# Patient Record
(demographics unavailable — no encounter records)

---

## 2019-11-29 NOTE — RAD REPORT
EXAM DESCRIPTION:  Arianna Single View11/28/2019 11:00 pm

 

CLINICAL HISTORY:  Cough

 

COMPARISON:  2017

 

FINDINGS:   Mild bilateral pulmonary opacities. The heart is normal size

 

IMPRESSION:  Mild bilateral pulmonary opacities may indicate pneumonitis

## 2019-11-29 NOTE — RAD REPORT
EXAM DESCRIPTION:  US - Thyroid Para Parotid Gland - 11/29/2019 1:18 pm

 

CLINICAL HISTORY:  Thyroid nodule or mass right side of the neck chest junction

 

COMPARISON:  CT neck November 29

 

FINDINGS:  A 14 millimeter nodule is present in the mid right lobe. An 8 millimeter hypoechoic nodule
 is present in the mid right lobe with peripheral rim calcification. A dominant 19 millimeter nodule 
is present in the inferior right lobe. This is believed to be the correlate to the CT finding.

 

Isthmus is 4-5 mm. A 5 millimeter hypoechoic nodule is present in the lower left lobe with a vague 5 
mm solid nodule seen in the inferior left lobe as well.

 

Right lobe is 4.7 x 1.6 x 1.7 cm. Left lobe is 3.2 x 1.3 x 1.2 cm.

 

No adjacent mass or lymphadenopathy identified.

 

IMPRESSION:  Prominent right thyroid lobe with multiple enlarged nodules largest inferior margin 19 m
m. This is probably the correlate to the CT finding.

 

Small 5 mm or less left thyroid nodules.

## 2019-11-29 NOTE — EDPHYS
Physician Documentation                                                                           

 [facility]                                                                 

Name: Cherelle Chang                                                                                

Age: 62 yrs                                                                                       

Sex: Female                                                                                       

: 1957                                                                                   

MRN: [id number]                                                                                   

Arrival Date: 2019                                                                          

Time: 22:00                                                                                       

Account#: [id number]                                                                            

Bed 7                                                                                             

Private MD:                                                                                       

ED Physician Norris Castelan                                                                             

HPI:                                                                                              

                                                                                             

22:35 This 62 yrs old  Female presents to ER via Ambulatory with complaints of        pkl 

      Breathing Difficulty.                                                                       

22:35 The patient has shortness of breath at rest. Onset: The symptoms/episode began/occurred pkl 

      1 week(s) ago. Associated signs and symptoms: Pertinent positives: productive cough.        

      Patient was admitted to [facility] 3 days ago and was discharged yesterday.          

      Diagnosed with COPD exacerbation and diabetes. Patient said she is not any better. Does     

      not want to go back to [facility].                                                   

                                                                                                  

Historical:                                                                                       

- Allergies:                                                                                      

22:20 NKA;                                                                                    bb  

- Home Meds:                                                                                      

22:20 Albuterol Inhl [Active]; Atrovent Inhl [Active]; levofloxacin 750 mg Oral tab 1 tab     bb  

      once daily [Active]; metformin 500 mg Oral Tb24 1 tab once daily [Active]; prednisone       

      20 mg Oral tab once daily [Active];                                                         

- PMHx:                                                                                           

22:20 COPD; Diabetes - NIDDM;                                                                 bb  

- PSHx:                                                                                           

22:20 Hysterectomy; ;                                                                bb  

                                                                                                  

- Immunization history:: Adult Immunizations unknown.                                             

- Social history:: Smoking status: Patient uses tobacco products, smokes one-half pack            

  cigarettes per day.                                                                             

- Ebola Screening: : No symptoms or risks identified at this time.                                

                                                                                                  

                                                                                                  

ROS:                                                                                              

22:35 Eyes: Negative for injury, pain, redness, and discharge, ENT: Negative for injury,      pkl 

      pain, and discharge, Neck: Negative for injury, pain, and swelling, Cardiovascular:         

      Negative for chest pain, palpitations, and edema.                                           

22:35 Eyes: Negative for acute changes.                                                           

22:35 ENT: Negative for acute changes.                                                            

22:35 Neck: Negative for stiffness.                                                               

22:35 Cardiovascular: Negative for chest pain.                                                    

22:35 Respiratory: Positive for cough, with green sputum, shortness of breath.                    

22:35 Abdomen/GI: Negative for abdominal pain, nausea, vomiting, and diarrhea.                    

22:35 Back: Negative for pain at rest.                                                            

22:35 : Negative for urinary symptoms.                                                          

22:35 MS/extremity: Negative for acute changes.                                                   

22:35 Skin: Negative for rash.                                                                    

22:35 Neuro: Negative for altered mental status.                                                  

                                                                                                  

Exam:                                                                                             

22:35 Head/Face:  Normocephalic, atraumatic. Eyes:  Pupils equal round and reactive to light, pkl 

      extra-ocular motions intact.  Lids and lashes normal.  Conjunctiva and sclera are           

      non-icteric and not injected.  Cornea within normal limits.  Periorbital areas with no      

      swelling, redness, or edema. ENT:  Nares patent. No nasal discharge, no septal              

      abnormalities noted.  Tympanic membranes are normal and external auditory canals are        

      clear.  Oropharynx with no redness, swelling, or masses, exudates, or evidence of           

      obstruction, uvula midline.  Mucous membranes moist. Neck:  Trachea midline, no             

      thyromegaly or masses palpated, and no cervical lymphadenopathy.  Supple, full range of     

      motion without nuchal rigidity, or vertebral point tenderness.  No Meningismus.             

      Chest/axilla:  Normal chest wall appearance and motion.  Nontender with no deformity.       

      No lesions are appreciated. Cardiovascular:  Regular rate and rhythm with a normal S1       

      and S2.  No gallops, murmurs, or rubs.  Normal PMI, no JVD.  No pulse deficits.             

22:35 Respiratory: moderate respiratory distress is noted,  Respirations: labored breathing,      

      Breath sounds: rales, that are mild, are scattered, bronchial sounds, that are mild,        

      are scattered, Respiratory rate:  Rate  36/min                                              

22:35 Abdomen/GI: Bowel sounds: normal, Palpation: abdomen is soft and non-tender, in all         

      quadrants.                                                                                  

22:35 Back: Exam negative for acute changes.                                                      

22:35 : Exam negative for acute changes.                                                        

22:35 Musculoskeletal/extremity: Exam is negative for acute changes.                              

22:35 Skin: Exam negative for rash.                                                               

22:35 Neuro: Orientation: is normal, Mentation: is normal, Cranial nerves: grossly normal,        

      Motor: is normal.                                                                           

                                                                                                  

Vital Signs:                                                                                      

22:20  / 91; Pulse 115; Resp 36 S; Temp 98.6(O); Pulse Ox 90% on R/A; Weight 65.77 kg   bb  

      (R); Height 5 ft. 3 in. (160.02 cm) (R); Pain 8/10;                                         

23:02  / 92; Pulse 114; Resp 20 S; Pulse Ox 97% on Nebulizer Mask;                      jd3 

                                                                                             

00:29  / 82; Pulse 106; Resp 19 S; Pulse Ox 93% on 2 lpm NC;                            rr5 

01:28  / 80; Pulse 105; Resp 20; Pulse Ox 94% on 3 lpm NC;                              rr5 

02:23  / 82; Pulse 100; Resp 21; Pulse Ox 91% on 3 lpm NC;                              rr5 

03:00  / 70; Pulse 99; Resp 24; Pulse Ox 90% on 4 lpm NC;                               rr5 

04:00  / 65; Pulse 97; Resp 22; Temp 99; Pulse Ox 91% on 4 lpm NC;                      rr5 

                                                                                             

22:20 Body Mass Index 25.69 (65.77 kg, 160.02 cm)                                             bb  

                                                                                                  

MDM:                                                                                              

                                                                                             

22:01 Patient medically screened.                                                             pkl 

                                                                                             

01:53 Data reviewed: vital signs, nurses notes, lab test result(s), EKG, radiologic studies,  pkl 

      CT scan, plain films. ED course: Discussed patient with Dr. Sneed. Will evaluate         

      patient in ER.                                                                              

                                                                                                  

                                                                                             

22:28 Order name: Basic Metabolic Panel                                                       pkl 

                                                                                             

22:28 Order name: CBC with Diff                                                               pkl 

                                                                                             

22:28 Order name: LFT's                                                                       pkl 

                                                                                             

22:28 Order name: Magnesium                                                                   pkl 

                                                                                             

22:28 Order name: NT PRO-BNP                                                                  pkl 

                                                                                             

22:28 Order name: PT-INR                                                                      pkl 

                                                                                             

22:28 Order name: Troponin (emerg Dept Use Only)                                              pkl 

                                                                                             

22:28 Order name: Blood Culture Adult (2)                                                     pkl 

                                                                                             

22:28 Order name: Lactate                                                                     pkl 

                                                                                             

22:28 Order name: ABG                                                                         pkl 

                                                                                             

22:45 Order name: Hemoglobin A1c                                                              pkl 

                                                                                             

22:45 Order name: ABG Arterial Blood Gas; Complete Time: 22:47                                EDMS

                                                                                             

22:48 Order name: D-Dimer                                                                     pkl 

                                                                                             

23:10 Order name: CBC with Automated Diff; Complete Time: 23:13                               EDMS

                                                                                             

22:28 Order name: XRAY Chest (1 view)                                                         pkl 

                                                                                             

23:18 Order name: Protime (+INR); Complete Time: 23:19                                        EDMS

                                                                                             

23:25 Order name: Lactate; Complete Time: 23:34                                               EDMS

                                                                                             

23:32 Order name: Basic Metabolic Panel; Complete Time: 23:34                                 EDMS

                                                                                             

23:32 Order name: Liver (Hepatic) Function; Complete Time: 23:34                              EDMS

                                                                                             

23:32 Order name: Troponin (Emerg Dept Use Only); Complete Time: 23:34                        EDMS

                                                                                             

23:32 Order name: NT PRO-BNP; Complete Time: 23:34                                            EDMS

                                                                                             

23:32 Order name: Magnesium; Complete Time: 23:34                                             EDMS

                                                                                             

23:34 Order name: D-Dimer; Complete Time: 23:34                                               EDMS

                                                                                             

23:37 Order name: CT Chest For PE Angio                                                       pkl 

                                                                                             

04:05 Order name: Troponin I; Complete Time: 06:02                                            EDMS

                                                                                             

04:07 Order name: Hemoglobin A1c; Complete Time: 06:02                                        EDMS

                                                                                             

22:28 Order name: EKG; Complete Time: 22:29                                                   pkl 

                                                                                             

22:28 Order name: Cardiac monitoring; Complete Time: 22:48                                    pkl 

                                                                                             

22:28 Order name: EKG - Nurse/Tech; Complete Time: 22:48                                      pkl 

                                                                                             

22:28 Order name: IV Saline Lock; Complete Time: 23:12                                        pkl 

                                                                                             

22:28 Order name: Labs collected and sent; Complete Time: 22:48                               pkl 

                                                                                             

22:28 Order name: O2 Per Protocol; Complete Time: 22:48                                       pkl 

                                                                                             

22:28 Order name: O2 Sat Monitoring; Complete Time: 22:48                                     pkl 

                                                                                                  

Administered Medications:                                                                         

                                                                                             

22:40 Drug: Albuterol - atroVENT (3:1) (2.5 mg - 0.5 mg) 3 ml Route: Nebulizer;               rr5 

                                                                                             

00:51 Follow up: Response: No adverse reaction                                                jd3 

                                                                                             

23:01 Drug: NS 0.9% 1000 ml Route: IV; Rate: 125 ml/hr; Site: right forearm;                  jd3 

                                                                                             

04:27 Follow up: Response: No adverse reaction; IV Status: Infusion continued upon admission; rr5 

      IV Intake: 650ml                                                                            

                                                                                             

23:01 Drug: morphine 2 mg Route: IVP; Site: right forearm;                                    jd3 

                                                                                             

00:00 Follow up: Response: No adverse reaction; RASS: Alert and Calm (0)                      d3 

                                                                                             

23:01 Drug: Zofran 4 mg Route: IVP; Site: right forearm;                                      d3 

                                                                                             

00:00 Follow up: Response: No adverse reaction                                                d3 

00:44 Drug: K-Dur 40 mEq Route: PO;                                                           d3 

02:00 Follow up: Response: No adverse reaction                                                rr5 

                                                                                                  

                                                                                                  

Disposition:                                                                                      

19 02:31 Hospitalization ordered by Jeison Sneed for Inpatient Admission. Preliminary    

  diagnosis is Hypoxia. Bilateral pneumonia.                                                      

- Bed requested for Telemetry/MedSurg (Inpatient).                                                

- Status is Inpatient Admission.                                                              rr5 

- Condition is Stable.                                                                            

- Problem is new.                                                                                 

- Symptoms are unchanged.                                                                         

UTI on Admission? No                                                                              

                                                                                                  

                                                                                                  

                                                                                                  

Signatures:                                                                                       

Dispatcher [employer]                           EDMS                                                 

Nell Cabello RN RN mw Lam, Pin, MD MD   pkl                                                  

Paige Son RN                     RN   Deniz Sam RN RN   jd3                                                  

Jered Kat RN                      RN   rr5                                                  

                                                                                                  

Corrections: (The following items were deleted from the chart)                                    

03:44 02:31 Hospitalization Ordered by Jeison Sneed MD for Inpatient Admission. Preliminary   

      diagnosis is Hypoxia. Bilateral pneumonia. Bed requested for Telemetry/MedSurg              

      (Inpatient). Status is Inpatient Admission. Condition is Stable. Problem is new.            

      Symptoms are unchanged. UTI on Admission? No. pkl                                           

04:28 03:44 2019 02:31 Hospitalization Ordered by Jeison Sneed MD for Inpatient       rr5 

      Admission. Preliminary diagnosis is Hypoxia. Bilateral pneumonia. Bed requested for         

      Telemetry/MedSurg (Inpatient). Status is Inpatient Admission. Condition is Stable.          

      Problem is new. Symptoms are unchanged. UTI on Admission? No. mw                            

                                                                                                  

**************************************************************************************************

## 2019-11-29 NOTE — HP
Date of Admission:  2019



Presenting Complaint:  Worsening shortness of breath.



History Of Present Illness:  Ms. Cherelle Chang is a 62-year-old  female 
with past medical history of GERD and chronic tobacco use, who developed upper 
respiratory tract congestion with rhinorrhea as well as cough usually 
productive of whitish sputum and worsening shortness of breath about 1 week 
ago.  She was admitted to [facility] where she was managed for presumed 
COPD exacerbation with antibiotics as well as steroids.  She was discharged 
home yesterday.  The patient has had continued worsening shortness of breath as 
well as intermittent fever with chills and progressive chest tightness.  She 
presented today because of worsening symptoms in the ER.  Her O2 saturation was 
low at 82% on room air.  She was started on nasal cannula O2, which improved O2 
saturation to 95% now.  The patient feels a bit better.  The patient is still 
having chills.  She states she was also given metformin and diagnosed with new-
onset diabetes while in the hospital.  She admits to still active tobacco smoke 
use.  She denies any sick contacts.  She denies any recent travel.



Past Medical History:  Significant for GERD.



Social History:  She lives in the community.  She admits to half a pack of 
cigarette use per day.  She denies any alcohol or illicit drug use.



Family History:  Significant for mother with history of asthma, father with 
history of hypertension.



Home Medications:  Currently patient is taking Levaquin, prednisone 20 daily, 
metformin 500 b.i.d. as well as DuoNebs via inhaler.



Allergies:  NO KNOWN DRUG ALLERGIES.



Review of Systems:

All other systems reviewed x10 were negative except as mentioned in HPI.



Physical Examination:

Current Vital Signs:  Blood pressure of 123/78, respiratory rate of 22, temp 
afebrile at 98.9, O2 saturation is 95% on 3 L nasal cannula, heart rate of 90 
beats per minute. 

General:  Average built, middle-aged female, anxious, but not in any 
respiratory distress, on nasal cannula O2. 

HEENT:  Head is atraumatic, normocephalic.  Pupils equal and reactive to light.
  Anicteric. 

Neck:  No JVD.  No carotid bruit. 

Respiratory:  Coarse crepitations with expiratory wheeze noted.  No neris 
rhonchi. 

Cardiovascular:  S1, S2.  Mildly tachycardic, but regular generalized anterior 
chest wall bilaterally. 

GI:  Mild epigastric tenderness, but no rebound.  Bowel sounds positive in all 
4 quadrants.  No hepatosplenomegaly.  No suprapubic fullness. 

Rectal:  Deferred. 

Extremities:  No calf tenderness.  No pedal edema. 

Neuro:  Patient is alert, oriented.  Cranial nerves 2 through 12 grossly intact.



Laboratory Data:  WBC 14.3, hemoglobin 13.8, platelet 345, neutrophils 63%.  
INR 0.9.  D-dimer 979.  ABG, pH 7.49, pCO2 34, PO2 52.  Sodium 139, potassium 
3.0, bicarb 27, BUN 18, creatinine 0.7.  Lactic acid 0.9.  AST, alkaline 
phosphatase normal.  Troponin less than 0.02.  ProBNP of 175.  Chest x-ray 
shows possible small __________ lobe infiltrate.  CTA shows no evidence of 
pulmonary embolism, but multifocal ground-glass opacity representing 
infiltrate.  Also, noted incidental thyroid nodule, recommended for the 
ultrasound evaluation.  Also, noted a small hypodense lesion in the liver what 
is a possible benign hemangioma.



Impression:  

1.   Bilateral pneumonia.

2.   Chronic obstructive pulmonary disease exacerbation.

3.   Chronic tobacco use.

4.   Recent hyperglycemia, on metformin.

5.   Hypokalemia.



Plan:  One we will admit patient to inpatient status.  We will manage patient 
for the followin.   COPD exacerbation.  We will do DuoNebs q.4.  We will do __________ q.6.  
We will also continue oxygen and wean as tolerated.  We will obtain sputum for 
culture and sensitivity if feasible.

2.   Bilateral pneumonia.  We will restart patient on antibiotics with Rocephin 
and follow sputum culture.

3.   Chronic tobacco use.  The need for tobacco cessation advised.  We will 
start patient on nicotine patch.

4.   History of GERD.  We will start Pepcid b.i.d.

5.   DVT prophylaxis, subcutaneous heparin.

6.   Advanced directive discussed with patient and patient wishes to be full 
code.

7.   Incidental thyroid nodule.  We will do thyroid/neck ultrasound.

8.   Recent hyperglycemia.  We will obtain hemoglobin A1c.  Do Accu-Cheks as 
well as insulin coverage.  We will continue metformin for now. 



Total time spent in review of record, discussion with patient and evaluation 
greater than 60 minutes.





EO/MODL

DD:  2019 03:03:35   Voice ID:  [id number]

MTDD

## 2019-11-29 NOTE — PN
Date of Progress Note:  11/29/2019



Code Status:  Full.



Subjective:  Patient is seen and examined. Chart reviewed and case discussed with RN.  Patient states
 that she feels better; however, still on 4 L of oxygen.



Medications:  List reviewed.



Physical Examination:

Vital Signs:  Temperature 98.2, heart rate 96, blood pressure 167/77, respirations 18, O2 92% on 4 L 
via nasal cannula. 

General:  Awake, alert, oriented x3.  Some mild distress. 

CV:  S1, S2.  Regular rate and rhythm.  Peripheral pulses present. 

Respiratory:  Diminished breath sounds, wheezing heard.  No stridor.  No use of accessory muscles. 

Gastrointestinal:  Abdomen is soft, nontender, nondistended.  Positive bowel sounds. 

Extremities:  No clubbing, cyanosis, or edema. 

Neurologic:  Nonfocal.



Laboratory Data:  Glucose between 153 and 115.  Hemoglobin A1c 6.4.  Troponin levels negative x2.  So
dium 139, potassium 3, chloride 105, CO2 of 27, BUN 18, creatinine 0.79, glucose 96, lactate 0.9.  WB
C 14.3, H and H 13.8 and 41, platelets 345.  Blood cultures and sputum cultures pending.  Thyroid ult
rasound shows prominent right thyroid lobe with multiple enlarged nodules, largest inferior margin 19
 mm, small 5 mm or less left thyroid nodules.  CT angio chest shows no pulmonary embolism.  Multifoca
l bilateral ground-glass pulmonary infiltrates, trace pericardial fluid, superior pretracheal 1.8 cm 
soft tissue nodule may represent adenopathy or inferior right thyroid lobe nodule, 1.8 cm possible in
cidental thyroid nodule versus abnormal lymph node.  Recommend thyroid ultrasound, fatty liver, hyper
density, posterior medial right lobe, may represent focal fatty sparing or small vascular formation.



Assessment:  A 62-year-old female with:

1.Acute chronic obstructive pulmonary disease exacerbation.  We will continue with nebulizer treatme
nts, supplemental oxygen, currently on 4 L.

2.Acute respiratory distress with hypoxia, currently on 4 L secondary to chronic obstructive pulmona
ry disease.

3.Bilateral pneumonia.  Continue with IV antibiotics and follow up on blood cultures and sputum cult
ures.  Appreciate Pulmonology input.

4.Nicotine dependence with cigarette smoking, continuous, uncomplicated, counseled.

5.History of gastroesophageal reflux disease.  Continue Pepcid.

6.Incidental thyroid nodule.  Ultrasound shows multiple nodules in the right lobe as well as the lef
t lobe.  Patient will need outpatient ENT followup.

7.Hyperglycemia.  Patient is prediabetic.  Hemoglobin A1c 6.4%.  We will provide diabetic education.


8.Hypokalemia.  Replace and monitor.



Plan:  Plan is to continue treatment for now.  Likely discharge in the next 24 to 48 hours depending 
on clinical response.





/LOWELL

DD:  11/29/2019 15:59:02Voice ID:  [id number]

DT:  11/29/2019 19:39:31Report ID:  [id number]

## 2019-11-29 NOTE — P.CNS
Date of Consult: 11/29/19


Reason for Consult: Possible COPD


Chief Complaint: Shortness of breath


History of Present Illness: 





Patient is 62 years of age with a history of presume COPD heavy active smoker 

was recently discharged from [facility] with a diagnosis of presume COPD 

was there for 3 days got worse came back here to the emergency room and was 

admitted she is feeling a lot better no prior history of cardiopulmonary 

disorders and does not see any doctors or takes any medication denies any fever 

or chills currently she has had some fever at home productive green cough 

patient was prescribed prednisone and levofloxacin patient was hypoxic with a 

respiratory alkalosis


Allergies





No Known Allergies Allergy (Verified 11/29/19 05:04)


 





Home Medications: 








Albuterol Sulfate [Proair Hfa] 2 puff IN Q4HR PRN 11/29/19 


Ipratropium/Albuterol Sulfate [Iprat-Albut 0.5-3(2.5) mg/3 ml] 3 ml NEB Q4HR 

PRN 11/29/19 


Metformin HCl [Glucophage] 500 mg PO DAILY WITH BREAKFAST 11/29/19 


levoFLOXacin [Levaquin*] 1 tab PO DAILY 11/29/19 


predniSONE [Prednisone] 20 mg PO DAILY 11/29/19 








- Past Medical/Surgical History


Diabetic: Yes


-: Anxiety


-: Tobacco abuse


-: copd


-: DM (recent diagnosis)


-: Hysterectomy


-: Tubal ligation


-: Ectopic pregnancy


Psychosocial/ Personal History: Patient is .  She has 3 children.  She 

does not work.





- Family History


  ** Father


Medical History: Hypertension





  ** Mother


Medical History: Other (see notes)


Notes: asthma diabetes cervical cancer





- Social History


Smoking Status: Current every day smoker


Alcohol use: No


CD- Drugs: No


Caffeine use: Yes


Place of Residence: Home





Review of Systems


10-point ROS is otherwise unremarkable


General: Weakness


Respiratory: Cough, Shortness of Breath





Physical Examination














Temp Pulse Resp BP Pulse Ox


 


 98.2 F   96 H  18   167/77 H  90 L


 


 11/29/19 08:00  11/29/19 08:00  11/29/19 08:00  11/29/19 08:00  11/29/19 08:00








General: In no apparent distress, Oriented x3


Neck: Supple


Respiratory: Expiratory wheezes


Cardiovascular: No edema, Regular rate/rhythm, Normal S1 S2


Gastrointestinal: Normal bowel sounds, Non-distended


Laboratory Data (last 24 hrs)





11/28/19 22:50: PT 11.5, INR 0.97


11/28/19 22:50: WBC 14.3 H, Hgb 13.8, Hct 41.0, Plt Count 345


11/28/19 22:50: Sodium 139, Potassium 3.0 L, BUN 18, Creatinine 0.79, Glucose 96

, Magnesium 2.1, Total Bilirubin 0.3, AST 29, ALT 50, Alkaline Phosphatase 95








- Problems


(1) COPD exacerbation


Current Visit: Yes   Status: Acute   


Plan: 


Patient is 62 years of age admitted with shortness of breath for the past week 

was just recently discharged from [facility] on prednisone and 

levofloxacin did not improve got worse and appeared back on the floor she is 

hypoxic with a respiratory alkalosis white count is mildly elevated CT scan 

shows bilateral ground-glass changes CT angiogram report pending patient is 

feeling a lot better patient does not take any medications at home reduce dose 

of Solu-Medrol 1 dose of Lasix blood cultures are pending at long-acting 

bronchodilator

## 2019-11-29 NOTE — RAD REPORT
EXAM DESCRIPTION:  CT - Chest For Pe Angio - 11/29/2019 2:32 am

 

CLINICAL HISTORY:  Elevated   D-dimer;Dyspnea

 

COMPARISON:  None.

 

TECHNIQUE:  Axial CT imaging of the thorax utilizing intravenous contrast. Reformatted multiplanar im
ages obtained including reconstructed maximum intensity projection images.

This exam was performed according to our departmental dose-optimization program which includes automa
allan exposure control, adjustment of the mA and/or kV according to patient size and/or use of iterativ
e reconstruction technique

 

FINDINGS:  PULMONARY ARTERIES:  Normal caliber main pulmonary artery. No filling defect within the ce
ntral or peripheral pulmonary arteries.

HEART/GREAT VESSELS:  Normal cardiac size. Trace pericardial fluid. Normal caliber thoracic aorta wit
h moderate atherosclerosis.

MEDIASTINUM/ALEX:  There is a superior pretracheal 1.8 x 1.2 x 1.8 cm soft tissue nodule which may re
present enlarged lymph node or inferior thyroid nodule. Bilateral hilar nonenlarged nodes are present
. Normal appearance of the trachea and main bronchi. Normal esophagus.

LUNGS/PLEURA:  Multifocal scattered groundglass opacities throughout the right and left lung. There i
s no pleural fluid or pneumothorax. No interstitial edema. Pleural spaces are clear.

CHEST WALL/SOFT TISSUES:  No axillary or supraclavicular adenopathy. Intact bony thorax.

UPPER ABDOMEN:  Moderate fatty liver. Hyperdense 1.2 cm focus in the posterior medial right lobe may 
represent an area of relative fatty sparing or vascular malformation. Normal imaged spleen.

 

IMPRESSION:  1. No pulmonary embolism.

2. Multifocal bilateral groundglass pulmonary infiltrates.

3. Trace pericardial fluid.

4. Superior pretracheal 1.8 cm soft tissue nodule may represent adenopathy or inferior right thyroid 
lobe nodule.

1.8 cm possible incidental thyroid nodule versus abnormal lymph node. Recommend thyroid US.

Reference: J Am Alia Radiol. 2015 Feb;12(2): 143-50

5. Fatty liver. Hyperdensity posterior medial right lobe may represent focal fatty sparing or small v
ascular formation.

 

Electronically signed by:   Terrie Velazquez DO   11/29/2019 1:10 AM CST Workstation: [id number]

 

Due to temporary technical issues with the PACS/Fluency reporting system, reports are being signed by
 the in house radiologist as a courtesy to ensure prompt reporting. The interpreting radiologist is f
ully responsible for the content of the report

## 2019-11-29 NOTE — EKG
Test Date:    2019-11-28               Test Time:    22:40:42

Technician:   RR                                     

                                                     

MEASUREMENT RESULTS:                                       

Intervals:                                           

Rate:         101                                    

NY:           116                                    

QRSD:         74                                     

QT:           348                                    

QTc:          451                                    

Axis:                                                

P:            33                                     

NY:           116                                    

QRS:          78                                     

T:            75                                     

                                                     

INTERPRETIVE STATEMENTS:                                       

                                                     

Sinus tachycardia

Otherwise normal ECG

Compared to ECG 08/01/2017 06:25:26

Sinus rhythm no longer present



Electronically Signed On 11-29-19 09:39:24 CST by Chad Coy

## 2019-11-29 NOTE — ER
Nurse's Notes                                                                                     

 [facility]                                                                 

Name: Cherelle Chang                                                                                

Age: 62 yrs                                                                                       

Sex: Female                                                                                       

: 1957                                                                                   

MRN: [id number]                                                                                   

Arrival Date: 2019                                                                          

Time: 22:00                                                                                       

Account#: [id number]                                                                            

Bed 7                                                                                             

Private MD:                                                                                       

Diagnosis: Hypoxia. Bilateral pneumonia                                                           

                                                                                                  

Presentation:                                                                                     

                                                                                             

22:16 Presenting complaint: Patient states: she was discharged from [facility] yesterday with a bb  

      new diagnosis of COPD and DM but she is having more difficulty breathing with back pain     

      and is spitting up blood. Transition of care: patient was not received from another         

      setting of care. Onset of symptoms was 2019. Risk Assessment: Do you want      

      to hurt yourself or someone else? Patient reports no desire to harm self or others.         

      Initial Sepsis Screen: Does the patient meet any 2 criteria? RR > 20 per min. HR > 90       

      bpm. Yes Does the patient have a suspected source of infection? Yes: Productive             

      cough/pneumonia If YES to both, name of provider notified: Norris Castelan MD. Care prior to        

      arrival: None.                                                                              

22:16 Method Of Arrival: Ambulatory                                                           bb  

22:16 Acuity: SHON 2                                                                           bb  

                                                                                                  

Triage Assessment:                                                                                

22:16 Respiratory: the patient has mild shortness of breath.                                  rr5 

                                                                                                  

Historical:                                                                                       

- Allergies:                                                                                      

22:20 NKA;                                                                                    bb  

- Home Meds:                                                                                      

22:20 Albuterol Inhl [Active]; Atrovent Inhl [Active]; levofloxacin 750 mg Oral tab 1 tab     bb  

      once daily [Active]; metformin 500 mg Oral Tb24 1 tab once daily [Active]; prednisone       

      20 mg Oral tab once daily [Active];                                                         

- PMHx:                                                                                           

22:20 COPD; Diabetes - NIDDM;                                                                 bb  

- PSHx:                                                                                           

22:20 Hysterectomy; ;                                                                bb  

                                                                                                  

- Immunization history:: Adult Immunizations unknown.                                             

- Social history:: Smoking status: Patient uses tobacco products, smokes one-half pack            

  cigarettes per day.                                                                             

- Ebola Screening: : No symptoms or risks identified at this time.                                

                                                                                                  

                                                                                                  

Screenin:26 Abuse screen: Denies threats or abuse. Denies injuries from another. Nutritional        rr5 

      screening: No deficits noted. Tuberculosis screening: No symptoms or risk factors           

      identified. Fall Risk IV access (20 points). Total Rodriguez Fall Scale indicates No Risk       

      (0-24 pts).                                                                                 

                                                                                                  

Assessment:                                                                                       

22:30 General: Appears uncomfortable, mild distress. Behavior is calm, cooperative,           rr5 

      appropriate for age.                                                                        

22:30 Pain: Complains of pain in back Pain does not radiate. Pain currently is 8 out of 10 on rr5 

      a pain scale. Quality of pain is described as aching, Pain began gradually, Is              

      intermittent. Neuro: Level of Consciousness is awake, alert, obeys commands, Oriented       

      to person, place, time, situation. Cardiovascular: Capillary refill < 3 seconds             

      Patient's skin is warm and dry. Rhythm is sinus tachycardia. Respiratory: Reports cough     

      that is productive, with blood Airway is patent Respiratory effort is even, Respiratory     

      pattern is tachypnea Breath sounds with crackles. GI: No signs and/or symptoms were         

      reported involving the gastrointestinal system. : No signs and/or symptoms were           

      reported regarding the genitourinary system. EENT: No signs and/or symptoms were            

      reported regarding the EENT system. Derm: Skin is intact, is healthy with good turgor,      

      Skin temperature is warm. Musculoskeletal: Circulation, motion, and sensation intact.       

      Capillary refill < 3 seconds, Reports pain in back Pain is 8 out of 10 on a pain scale.     

22:45 Reassessment: as per ED provider no need for now the IV fluid bolus.                    rr5 

23:02 Reassessment: Patient appears in no apparent distress at this time. Patient and/or      jd3 

      family updated on plan of care and expected duration. Pain level reassessed. Patient is     

      alert, oriented x 3, equal unlabored respirations, skin warm/dry/pink. Patient states       

      feeling better.                                                                             

                                                                                             

00:00 Reassessment: Patient appears in no apparent distress at this time. Patient and/or      rr5 

      family updated on plan of care and expected duration. Pain level reassessed. Patient is     

      alert, oriented x 3, equal unlabored respirations, skin warm/dry/pink. Patient states       

      feeling better. Patient states symptoms have improved.                                      

00:50 Reassessment: Patient appears in no apparent distress at this time. Patient is alert,   rr5 

      oriented x 3, equal unlabored respirations, skin warm/dry/pink. no complaints made          

      awaiting for CT result.                                                                     

02:00 Reassessment: Patient appears in no apparent distress at this time. Patient is alert,   rr5 

      oriented x 3, equal unlabored respirations, skin warm/dry/pink. resting eyes closed,        

      breathing spontaneously with oxygen support at 3 liters via nasal cannula.                  

02:15 Reassessment: Patient appears in no apparent distress at this time. hospitalist at      rr5 

      bedside examining the patient.                                                              

                                                                                                  

Vital Signs:                                                                                      

                                                                                             

22:20  / 91; Pulse 115; Resp 36 S; Temp 98.6(O); Pulse Ox 90% on R/A; Weight 65.77 kg   bb  

      (R); Height 5 ft. 3 in. (160.02 cm) (R); Pain 8/10;                                         

23:02  / 92; Pulse 114; Resp 20 S; Pulse Ox 97% on Nebulizer Mask;                      jd3 

                                                                                             

00:29  / 82; Pulse 106; Resp 19 S; Pulse Ox 93% on 2 lpm NC;                            rr5 

01:28  / 80; Pulse 105; Resp 20; Pulse Ox 94% on 3 lpm NC;                              rr5 

02:23  / 82; Pulse 100; Resp 21; Pulse Ox 91% on 3 lpm NC;                              rr5 

03:00  / 70; Pulse 99; Resp 24; Pulse Ox 90% on 4 lpm NC;                               rr5 

04:00  / 65; Pulse 97; Resp 22; Temp 99; Pulse Ox 91% on 4 lpm NC;                      rr5 

                                                                                             

22:20 Body Mass Index 25.69 (65.77 kg, 160.02 cm)                                             bb  

                                                                                                  

ED Course:                                                                                        

                                                                                             

22:00 Patient arrived in ED.                                                                  mr  

22:01 Norris Castelan MD is Attending Physician.                                                    pkl 

22:18 Triage completed.                                                                       bb  

22:20 Arm band placed on Patient placed in an exam room, on a stretcher, on pulse oximetry.   bb  

22:21 Jered Kat, RN is Primary Nurse.                                                    rr5 

22:35 EKG done, by ED staff, reviewed by Norris Castelan MD.                                          rr5 

22:44 Patient has correct armband on for positive identification. Placed in gown. Bed in low  rr5 

      position. Call light in reach. Side rails up X2. Cardiac monitor on. Pulse ox on. NIBP      

      on.                                                                                         

22:59 Inserted saline lock: 20 gauge in right forearm, using aseptic technique. Blood         oe  

      collected.                                                                                  

23:34 Notified ED physician of a critical lab result(s). DDimer of [id number] Dr Castelan notified.       bb  

                                                                                             

00:42 CT completed. Patient tolerated procedure well. Patient moved to CT via stretcher.        

      Patient moved back from CT.                                                                 

02:30 Jeison Sneed MD is Hospitalizing Provider.                                          pkl 

04:25 No provider procedures requiring assistance completed. Patient admitted, IV remains in  rr5 

      place. intact, No redness/swelling at site.                                                 

                                                                                                  

Administered Medications:                                                                         

                                                                                             

22:40 Drug: Albuterol - atroVENT (3:1) (2.5 mg - 0.5 mg) 3 ml Route: Nebulizer;               rr5 

                                                                                             

00:51 Follow up: Response: No adverse reaction                                                jd3 

                                                                                             

23:01 Drug: NS 0.9% 1000 ml Route: IV; Rate: 125 ml/hr; Site: right forearm;                  jd3 

                                                                                             

04:27 Follow up: Response: No adverse reaction; IV Status: Infusion continued upon admission; rr5 

      IV Intake: 650ml                                                                            

                                                                                             

23:01 Drug: morphine 2 mg Route: IVP; Site: right forearm;                                    jd3 

                                                                                             

00:00 Follow up: Response: No adverse reaction; RASS: Alert and Calm (0)                      jd3 

                                                                                             

23:01 Drug: Zofran 4 mg Route: IVP; Site: right forearm;                                      jd3 

                                                                                             

00:00 Follow up: Response: No adverse reaction                                                jd3 

00:44 Drug: K-Dur 40 mEq Route: PO;                                                           jd3 

02:00 Follow up: Response: No adverse reaction                                                rr5 

                                                                                                  

                                                                                                  

Intake:                                                                                           

04:27 IV: 650ml; Total: 650ml.                                                                rr5 

                                                                                                  

Outcome:                                                                                          

02:31 Decision to Hospitalize by Provider.                                                    pkl 

04:20 Admitted to Tele accompanied by nurse, via wheelchair, room 424, with oxygen, with      rr5 

      chart, Report called to  pinky                                                            

04:20 Condition: stable                                                                       rr5 

04:20 Instructed on the need for admit.                                                           

04:28 Patient left the ED.                                                                    rr5 

                                                                                                  

Signatures:                                                                                       

Norris Castelan MD MD pkl Rivera, Evita                                 mr Campuzano Marcus                                                                                   

Paige Son, RN                     RN   Jas Tatum Jonathon, RN RN   jJered Escobar RN                      RN   rr5                                                  

                                                                                                  

Corrections: (The following items were deleted from the chart)                                    

                                                                                             

23:26 23:10 Reassessment: Patient appears in no apparent distress at this time. Patient is    rr5 

      alert, oriented x 3, equal unlabored respirations, skin warm/dry/pink. Patient states       

      feeling better. Patient states symptoms have improved. rr5                                  

                                                                                             

00:54 00:29  / 82; Pulse 106bpm; Resp 19bpm; Spontaneous; Pulse Ox 93% RA; jd3          rr5 

                                                                                                  

**************************************************************************************************

## 2019-11-30 NOTE — DS
Date of Discharge:  11/30/2019



Consultants:  Dr. Renteria with Pulmonology.



Admitting Diagnoses:  

1.Bilateral pneumonia.

2.Acute chronic obstructive pulmonary disease exacerbation.

3.Chronic nicotine dependence with cigarette smoking, continuous.

4.Gastroesophageal reflux disease without esophagitis.

5.Incidental thyroid nodule.

6.Recent hyperglycemia, prediabetes.



Discharge Diagnoses:  

1.Acute chronic obstructive pulmonary disease exacerbation, improved.

2.Acute respiratory distress with hypoxia.

3.Bilateral pneumonia.

4.Nicotine dependence with cigarette smoking, uncomplicated, continuous.

5.History of gastroesophageal reflux disease without esophagitis.

6.Incidental thyroid nodule, ENT followup.

7.Hyperglycemia, prediabetes.

8.Hypokalemia, replaced.



Hospital Course:  Patient is a 62-year-old female with past medical history of COPD, hyperglycemia, G
ERD, continues to smoke, comes in with worsening shortness of breath.  Patient was recently admitted 
to [facility], was found to have COPD exacerbation and discharged with antibiotics and steroid
s.  Patient was discharged.  Day prior to admission, she was found to be hypoxic.  Her lactate was ne
gative.  Chest x-ray showed possible small bilateral lobe infiltrates.  CT angio did not show any PE,
 did show multifocal ground-glass opacities representing infiltrate.  She was also found to have inci
dental thyroid nodules.  These are detailed in the progress notes and report.  She will need to follo
w up with ENT for the multiple thyroid nodules.  Patient was started on nebulizer treatments and ster
oids.  She was seen by pulmonologist.  She was initially requiring 4 L of oxygen via nasal cannula.  
This was able to be weaned off.  The patient's symptoms improved, she was able to ambulate without di
fficulty.  Her O2 saturations were 92% on room air after ambulation.  Her cultures remained negative 
to date.  Sputum culture showed normal faustino.  Patient was then cleared for discharge from Pulmonolog
y standpoint.  She was sent home in a stable condition.



Activity:  As tolerated.



Medications:  As per medication reconciliation list. 



Patient will need diet and exercise regimen and diabetes education.  She will need to follow up with 
her repeat hemoglobin A1c, which was 6.4, and she does plan on changing her diet and exercise regimen
.  She states that her primary care physician has given her 3-4 months prior to starting her on diabe
janell oral medications.  She will also need to follow up with pulmonologist, Dr. Renteria in 2 weeks, r
eturn to ER for worsening condition.



Diet:  Diabetic.



Activity:  As tolerated.



Physical Examination:

General:  Awake, alert, and oriented x3. 

CV:  S1, S2. 

Respiratory:  Moving air well bilaterally. 

Abdomen:  Abdomen is soft, nontender, nondistended.  Positive bowel sounds. 

Extremities:  No clubbing, cyanosis, or edema. 

Neurologic:  Nonfocal. 



Total time spent discharging the patient was 37 minutes.





/LOWELL

DD:  11/30/2019 16:00:24Voice ID:  [id number]

DT:  11/30/2019 21:15:25Report ID:  [id number]